# Patient Record
Sex: FEMALE | Race: WHITE
[De-identification: names, ages, dates, MRNs, and addresses within clinical notes are randomized per-mention and may not be internally consistent; named-entity substitution may affect disease eponyms.]

---

## 2023-10-16 ENCOUNTER — APPOINTMENT (OUTPATIENT)
Dept: ORTHOPEDIC SURGERY | Facility: CLINIC | Age: 68
End: 2023-10-16
Payer: MEDICARE

## 2023-10-16 VITALS — HEIGHT: 63 IN | BODY MASS INDEX: 27.46 KG/M2 | WEIGHT: 155 LBS

## 2023-10-16 PROBLEM — Z00.00 ENCOUNTER FOR PREVENTIVE HEALTH EXAMINATION: Status: ACTIVE | Noted: 2023-10-16

## 2023-10-16 PROCEDURE — 73110 X-RAY EXAM OF WRIST: CPT | Mod: LT

## 2023-10-16 PROCEDURE — L3984 UPPER EXT FX ORTHOSIS WRIST: CPT | Mod: LT

## 2023-10-16 PROCEDURE — 99203 OFFICE O/P NEW LOW 30 MIN: CPT

## 2023-10-23 ENCOUNTER — APPOINTMENT (OUTPATIENT)
Dept: ORTHOPEDIC SURGERY | Facility: CLINIC | Age: 68
End: 2023-10-23
Payer: MEDICARE

## 2023-10-23 PROCEDURE — 25600 CLTX DST RDL FX/EPHYS SEP WO: CPT

## 2023-10-23 PROCEDURE — 73110 X-RAY EXAM OF WRIST: CPT | Mod: LT

## 2023-10-23 PROCEDURE — 99213 OFFICE O/P EST LOW 20 MIN: CPT | Mod: 57

## 2023-11-06 ENCOUNTER — APPOINTMENT (OUTPATIENT)
Dept: ORTHOPEDIC SURGERY | Facility: CLINIC | Age: 68
End: 2023-11-06

## 2023-11-07 ENCOUNTER — APPOINTMENT (OUTPATIENT)
Dept: ORTHOPEDIC SURGERY | Facility: CLINIC | Age: 68
End: 2023-11-07
Payer: MEDICARE

## 2023-11-07 PROCEDURE — 73110 X-RAY EXAM OF WRIST: CPT | Mod: LT

## 2023-11-07 PROCEDURE — 99024 POSTOP FOLLOW-UP VISIT: CPT

## 2023-12-11 ENCOUNTER — APPOINTMENT (OUTPATIENT)
Dept: ORTHOPEDIC SURGERY | Facility: CLINIC | Age: 68
End: 2023-12-11
Payer: MEDICARE

## 2023-12-11 DIAGNOSIS — S52.509A UNSPECIFIED FRACTURE OF THE LOWER END OF UNSPECIFIED RADIUS, INITIAL ENCOUNTER FOR CLOSED FRACTURE: ICD-10-CM

## 2023-12-11 PROCEDURE — 99024 POSTOP FOLLOW-UP VISIT: CPT

## 2023-12-13 NOTE — PHYSICAL EXAM
[de-identified] : Left wrist No wounds NTTP throughout Slightly reduced wrist ROM Able to make a full painless composite fist + AIN/PIN/ulnar n SILT throughout Fingers wwp

## 2023-12-13 NOTE — DISCUSSION/SUMMARY
[de-identified] : - again reviewed the nature of this injury, imaging and prognosis with the patient - NSAIDs as needed for pain - complete PT - okay to return to activities without restrictions - f/u as needed

## 2023-12-13 NOTE — HISTORY OF PRESENT ILLNESS
[de-identified] : 12/11/23: The patient returns today for repeat evaluation of her left wrist.  She has been working with occupational therapy and sees significant improvements in her range of motion and strength.  She is quite happy with her outcome thus far.  She has no complaints today.   10/16/2023  KADEEM 68 year F is here for Location: Left Wrist  Complaint: Patient reports a trip and fall landing on her left outstretched wrist on 10/12/2023.  She noted immediate pain and swelling and was seen at the ER where she was placed in a splint with instructions to follow-up as an outpatient.  Today the patient continues to note moderate pain in the wrist but denies numbness and tingling. Of note, the patient is very active despite being retired. She has 2 large dogs that she cares for. She has a history of a R distal radius fx treated nonoperatively (40 years ago) and R elbow fracture treated operatively (30 years ago). Onset: 10/12/2023 Prior treatments: [facility] - splinted Hand dominance: right Occupation: retired